# Patient Record
Sex: MALE | Race: BLACK OR AFRICAN AMERICAN | NOT HISPANIC OR LATINO | Employment: FULL TIME | URBAN - METROPOLITAN AREA
[De-identification: names, ages, dates, MRNs, and addresses within clinical notes are randomized per-mention and may not be internally consistent; named-entity substitution may affect disease eponyms.]

---

## 2017-06-14 ENCOUNTER — HOSPITAL ENCOUNTER (EMERGENCY)
Facility: HOSPITAL | Age: 42
Discharge: HOME | End: 2017-06-14

## 2017-06-14 VITALS
WEIGHT: 205 LBS | TEMPERATURE: 98 F | SYSTOLIC BLOOD PRESSURE: 162 MMHG | DIASTOLIC BLOOD PRESSURE: 86 MMHG | BODY MASS INDEX: 32.18 KG/M2 | HEART RATE: 78 BPM | OXYGEN SATURATION: 99 % | HEIGHT: 67 IN | RESPIRATION RATE: 16 BRPM

## 2017-06-14 DIAGNOSIS — M62.838 NECK MUSCLE SPASM: Primary | ICD-10-CM

## 2017-06-14 DIAGNOSIS — M54.2 NECK PAIN: ICD-10-CM

## 2017-06-14 PROCEDURE — 99283 EMERGENCY DEPT VISIT LOW MDM: CPT

## 2017-06-14 RX ORDER — DICLOFENAC SODIUM 75 MG/1
75 TABLET, DELAYED RELEASE ORAL 2 TIMES DAILY
Qty: 20 TABLET | Refills: 0 | Status: SHIPPED | OUTPATIENT
Start: 2017-06-14

## 2017-06-14 RX ORDER — METHOCARBAMOL 500 MG/1
1000 TABLET, FILM COATED ORAL 3 TIMES DAILY
Qty: 30 TABLET | Refills: 0 | Status: SHIPPED | OUTPATIENT
Start: 2017-06-14 | End: 2017-06-14

## 2017-06-14 RX ORDER — METHOCARBAMOL 500 MG/1
1000 TABLET, FILM COATED ORAL 3 TIMES DAILY
Qty: 30 TABLET | Refills: 0 | Status: SHIPPED | OUTPATIENT
Start: 2017-06-14 | End: 2017-06-19

## 2017-06-14 RX ORDER — DICLOFENAC SODIUM 75 MG/1
75 TABLET, DELAYED RELEASE ORAL 2 TIMES DAILY
Qty: 20 TABLET | Refills: 0 | Status: SHIPPED | OUTPATIENT
Start: 2017-06-14 | End: 2017-06-14
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
## 2017-06-14 NOTE — ED NOTES
Bed: TL 01  Expected date:   Expected time:   Means of arrival:   Comments:     ROCCO Berrios  06/14/17 0831

## 2020-11-11 ENCOUNTER — TELEPHONE (OUTPATIENT)
Dept: UROLOGY | Facility: CLINIC | Age: 45
End: 2020-11-11

## 2020-11-11 NOTE — TELEPHONE ENCOUNTER
----- Message from Mariana Lockett sent at 11/11/2020  8:46 AM CST -----  Regarding: Same Day Appt  Contact: pt's mom  Type:  Same Day Appointment Request    Caller is requesting a same day appointment.  Caller declined first available appointment listed below.      Name of Caller:  pt's mom  When is the first available appointment?  12/2  Symptoms:  7mm kidney stones  Best Call Back Number:  [phone]  Additional Information:   pt was in ER last night

## 2020-11-11 NOTE — TELEPHONE ENCOUNTER
Advised urologist that takes his insurance does nto have openings until 4/2021, gave number to [facility] to see if they have sooner openings.

## 2020-11-11 NOTE — TELEPHONE ENCOUNTER
----- Message from Chon Mccollum sent at 11/11/2020  9:06 AM CST -----  Type:  Same Day Appointment Request    Caller is requesting a same day appointment.  Caller declined first available appointment listed below.      Name of Caller:  Patient  When is the first available appointment?  N/A  Symptoms:  ER follow up; kidney stones 7mm x2 and 2 more (not disclosed specific size)  Best Call Back Number:  [phone]; [phone]  Additional Information: NA

## 2021-04-29 ENCOUNTER — PATIENT MESSAGE (OUTPATIENT)
Dept: RESEARCH | Facility: HOSPITAL | Age: 46
End: 2021-04-29

## 2023-08-08 PROBLEM — Z76.89 ENCOUNTER TO ESTABLISH CARE: Status: ACTIVE | Noted: 2023-08-08

## 2023-08-08 PROBLEM — R10.2 PELVIC PAIN IN MALE: Status: ACTIVE | Noted: 2023-08-08

## 2023-08-08 PROBLEM — F12.90 MARIJUANA SMOKER: Status: ACTIVE | Noted: 2023-08-08

## 2023-08-08 PROBLEM — E11.9 DM TYPE 2 (DIABETES MELLITUS, TYPE 2): Status: ACTIVE | Noted: 2023-08-08

## 2023-08-08 PROBLEM — S31.139A GUNSHOT WOUND, ABDOMINAL: Status: ACTIVE | Noted: 2023-08-08

## 2023-08-08 PROBLEM — N20.0 KIDNEY STONES: Status: ACTIVE | Noted: 2023-08-08

## 2023-08-08 PROBLEM — R03.0 ELEVATED BLOOD PRESSURE READING: Status: ACTIVE | Noted: 2023-08-08

## 2023-08-08 PROBLEM — E66.3 OVERWEIGHT FOR HEIGHT: Status: ACTIVE | Noted: 2023-08-08

## 2023-08-08 PROBLEM — Z87.81 H/O FRACTURE OF PELVIS: Status: ACTIVE | Noted: 2023-08-08
